# Patient Record
Sex: FEMALE | Race: BLACK OR AFRICAN AMERICAN | NOT HISPANIC OR LATINO | URBAN - METROPOLITAN AREA
[De-identification: names, ages, dates, MRNs, and addresses within clinical notes are randomized per-mention and may not be internally consistent; named-entity substitution may affect disease eponyms.]

---

## 2017-10-18 ENCOUNTER — EMERGENCY (EMERGENCY)
Facility: HOSPITAL | Age: 42
LOS: 1 days | Discharge: HOME | End: 2017-10-18
Attending: EMERGENCY MEDICINE | Admitting: EMERGENCY MEDICINE
Payer: COMMERCIAL

## 2017-10-18 VITALS
TEMPERATURE: 98 F | DIASTOLIC BLOOD PRESSURE: 84 MMHG | HEART RATE: 88 BPM | RESPIRATION RATE: 18 BRPM | OXYGEN SATURATION: 99 % | SYSTOLIC BLOOD PRESSURE: 119 MMHG

## 2017-10-18 DIAGNOSIS — F10.129 ALCOHOL ABUSE WITH INTOXICATION, UNSPECIFIED: ICD-10-CM

## 2017-10-18 DIAGNOSIS — R41.82 ALTERED MENTAL STATUS, UNSPECIFIED: ICD-10-CM

## 2017-10-18 PROCEDURE — 82962 GLUCOSE BLOOD TEST: CPT

## 2017-10-18 PROCEDURE — 99284 EMERGENCY DEPT VISIT MOD MDM: CPT | Mod: 25

## 2017-10-18 PROCEDURE — 99053 MED SERV 10PM-8AM 24 HR FAC: CPT

## 2017-10-18 PROCEDURE — 99285 EMERGENCY DEPT VISIT HI MDM: CPT

## 2017-10-18 NOTE — ED PROVIDER NOTE - MEDICAL DECISION MAKING DETAILS
The patient is now awake and alert, clinically sober.  Able to walk a straight line.  Repeat exam and neuro/cranial nerve exams normal.  No evidence of head/neck trauma.  Patient denies any pain or other complaints.  Denies cp/sob/ha/abd pain.  Abd soft, lungs clear, heart exam normal.  Ivett po challenge.  Patient says only used alcohol no other substances.  Denies any physical/sexual assault. Feels much better and pt feels safe for discharge.  No evidence of intoxication at this time or alcohol withdrawal.  No other complaints on discharge. Allowed to sober, no evidence of trauma on exam, no focal deficits. Prior to dc, the patient is  awake and alert, clinically sober.  Able to walk a straight line.  Repeat exam and neuro/cranial nerve exams normal.  No evidence of head/neck trauma.  Patient denies any pain or other complaints.  Denies cp/sob/ha/abd pain.  Abd soft, lungs clear, heart exam normal.  Ivett po challenge.  Patient says only used alcohol no other substances.  Denies any physical/sexual assault. Feels much better and pt feels safe for discharge.  No evidence of intoxication at this time or alcohol withdrawal.  No other complaints on discharge. Took an [employer] taxi to home.

## 2017-10-18 NOTE — ED PROVIDER NOTE - CHPI ED SYMPTOMS NEG
no nausea/no loss of consciousness/no weakness/no seizure/no change in level of consciousness/no syncope/no dizziness/no blurred vision/no vomiting/no confusion

## 2017-10-18 NOTE — ED ADULT TRIAGE NOTE - CHIEF COMPLAINT QUOTE
found on the sleeping on lobby of an apartment [address] at  [address] pt is +AOB. no obvious head trauma/ injury. found on the sleeping in the lobby of an apartment building at  [address] pt is +AOB. no obvious head trauma/ injury. found  sleeping in the lobby of an apartment building at  [address] pt is +AOB. no obvious head trauma/ injury.

## 2017-10-18 NOTE — ED ADULT NURSE NOTE - OBJECTIVE STATEMENT
41 yr female found in a lobby by ems. pt states she did not live there and admits to consuming alcohol tonight. states "im super, duper drunk" and had "7 Heineken lites". denies any drug use. pt refused to place id band on her, refuses complete vs, refused to change into yellow gown. pt is argumentative with staff and is non compliant. security at bedside. md anne notified and pt was evaluated by md anne. unsteady gait noted. pt denies any headache, dizziness, chest pain, or any other medical complaints. PCT at bedside at this time. will continue to monitor.

## 2017-10-18 NOTE — ED PROVIDER NOTE - OBJECTIVE STATEMENT
etoh use today, 7 beers, boyfriend called ems while pt in lobby after argument. 42 yo currently undergoing ivf treatments brought in for AMS, found sleeping in lobby. Admits to etoh use today, 7 beers, boyfriend called ems while pt in lobby after argument took place. denies physical/sexual trauma, no other drug use, states she just wants to go home and initially not cooperative w hx but was redirectable.

## 2017-10-18 NOTE — ED ADULT NURSE NOTE - CHIEF COMPLAINT QUOTE
found on the sleeping on lobby of an apartment building at  [address] pt is +AOB. no obvious head trauma/ injury.

## 2021-05-30 NOTE — ED PROVIDER NOTE - PHYSICAL EXAMINATION
CONSTITUTIONAL:well nourished, awake, alert and in no apparent distress. mildly intoxicated appearing, no slurring of speech.  HEENT: Head is atraumatic. Eyes clear bilaterally, normal EOMI. Airway patent.  CARDIAC: Normal rate, regular rhythm.  Heart sounds S1, S2.   RESPIRATORY: Breath sounds clear and equal bilaterally.  GASTROINTESTINAL: Abdomen soft, non-tender, no guarding.  MUSCULOSKELETAL: Spine appears normal, range of motion is not limited, no muscle or joint tenderness.  ambulates steady.  NEUROLOGICAL: Alert and oriented, no focal deficits, no motor or sensory deficits.  SKIN: Skin normal color for race, warm, dry and intact. No evidence of rash.  PSYCHIATRIC: Alert and oriented to person, place, time/situation. normal mood and affect. no apparent risk to self or others. Attending with